# Patient Record
(demographics unavailable — no encounter records)

---

## 2025-01-28 NOTE — PLAN
[FreeTextEntry1] : 25 y/o G0 female presents to establish care.  -Pap/HPV done -Continue Paraguard IUD -Pt advised to request prior PAP results  -RTO 1 year for annual

## 2025-01-28 NOTE — END OF VISIT
[FreeTextEntry3] : I, Bonnie Chase, acted as a scribe on behalf of Dr. Bonnie Jimenez M.D. on 01/28/2025 .   All medical entries made by the scribe were at my Dr. Bonnie Jimenez M.D direction and personally dictated by me on  01/28/2025 . I have reviewed the chart and agree that the record accurately reflects my personal performance of the history, physical exam, assessment and plan. I have also personally directed, reviewed, and agreed with the chart.

## 2025-01-28 NOTE — HISTORY OF PRESENT ILLNESS
[FreeTextEntry1] : 27 y/o G0 female presents to establish care. Pt currently has Paraguard IUD, inserted Feb 2024. Reports last pap was Feb 2024. Reports having normal colposcopy in the past.  SocHx: Works as  for [employer]. Lives with partner of last 5 years.   GynHx: HPV FamHx: Mom Cervical CA  NKDA

## 2025-02-10 NOTE — HISTORY OF PRESENT ILLNESS
[de-identified] : Initial visit: RIGHT KNEE PAIN  Reason: AFTER DANCE CLASS - FALL Duration: 3 DAYS  Prior studies: X RAYS @ [facility] Symptoms: POP/ SWELLING / PRESSURE  Aggravating Fx: WALKING / EXTENDING  Alleviating Fx:  CRUTCHES / ACE BANDAGE Pain level: 3.5/10 Pain medication: NONE Medical Hx: N/A Surgical Hx: NONE Current Medication: NONE Allergies: NKA

## 2025-02-10 NOTE — PHYSICAL EXAM
[de-identified] : Right knee  Constitutional:  The patient is healthy-appearing and in no apparent distress.   Gait: The patient ambulates with a limp and crutch assist  Cardiovascular System:  The capillary refill is less than 2 seconds.   Skin:  There are no skin abnormalities and there is a mild effusion.  Right Knee:   Bony Palpation:  There is no tenderness of the medial joint line.  There is no tenderness of the lateral joint line. There is no tenderness of the medial femoral chondyle. There is no tenderness of the lateral femoral chondyle. There is no tenderness of the tibial tubercle. There is no tenderness of the superior patella. There is no tenderness of the inferior patella. There is no tenderness of the medial patellar facet. There is no tenderness of the lateral patellar facet.  Soft Tissue Palpation:  There is no tenderness of the medial retinaculum. There is no tenderness of the lateral retinaculum. There is no tenderness of the quadriceps tendon. There is no tenderness of the patella tendon. There is no tenderness of the ITB. There is no tenderness of the pes anserine.  Active Range of Motion:  The range of motion at the knee actively and passively is full.   Special Tests:  There is a negative Apley. There is a negative Steinmanns.  There is a 3+ Lachman and Anterior Drawer. There is a negative Posterior Drawer.   There is no varus or valgus laxity.  Strength:  There is 5/5 hip flexion and 5/5 knee flexion and extension.    Psychiatric:  The patient demonstrates a normal mood and affect and is active and alert   [de-identified] : Based on detailed discussion with history and physical examination of the patient, x-ray evaluation is recommended and ordered for treatment and diagnosis. X-ray right knee 1 view sunrise: There is no significant bony / soft tissue abnormality, arthritis, or fracture. X-ray 3 view from [facility] imaging: There is no significant bony / soft tissue abnormality, arthritis, or fracture.